# Patient Record
(demographics unavailable — no encounter records)

---

## 2025-03-22 NOTE — PHYSICAL EXAM
[Appropriately responsive] : appropriately responsive [Alert] : alert [No Acute Distress] : no acute distress [Soft] : soft [Non-tender] : non-tender [No HSM] : No HSM [Non-distended] : non-distended [No Lesions] : no lesions [No Mass] : no mass [Oriented x3] : oriented x3 [Labia Majora] : normal [Labia Minora] : normal [Normal] : normal [FreeTextEntry4] : Discharge consistent with vaginal yeast with possible concurrent bacterial vaginosis; culture collected

## 2025-03-22 NOTE — PLAN
[FreeTextEntry1] : Subjective history and physical examination consistent with a vaginal yeast infection and patient was directed for fluconazole and instructed to take 1 pill every 3 days time x 3 doses and she was also written for terconazole to be used nightly for 7 nights time.  A culture was collected and she was instructed that if the culture results indicate she may be written for additional or alternative medications.  She should continue with infectious disease, as directed.  She is given opportunity ask questions and all questions were addressed.

## 2025-04-01 NOTE — PHYSICAL EXAM
[Antalgic] : antalgic [de-identified] : CONSTITUTIONAL: The patient is a very pleasant individual who is well-nourished and who appears stated age.  She is accompanied by formal caregiver and her .  Ambulates with a forward flexed gait and with the assistance of a walker. PSYCHIATRIC: The patient is alert and oriented X 3 and in no apparent distress, and participates with orthopedic evaluation well. HEAD: Atraumatic and is nonsyndromic in appearance. EENT: No visible thyromegaly, EOMI. RESPIRATORY: Respiratory rate is regular, not dyspneic on examination. LYMPHATICS: There is no inguinal lymphadenopathy INTEGUMENTARY: Skin is clean, dry, and intact about the bilateral lower extremities and lumbar spine. VASCULAR: There is brisk capillary refill about the bilateral lower extremities. NEUROLOGIC: There are no pathologic reflexes. There is no decrease in sensation of the bilateral lower extremities on manual  examination. Deep tendon reflexes are well maintained at 2+/4 of the bilateral lower extremities and are symmetric.. MUSCULOSKELETAL: Manual motor strength is well maintained in the bilateral lower extremities. Range of motion of lumbar spine is well maintained with an obvious gibbus on the left and tenderness to palpation the right lower lumbar area, right buttock. The patient ambulates with the assistance of a walker with a flexed forward gait and wide-based gait.  Positive bilateral tension sign and negative straight leg raise bilaterally. Quad extension, ankle dorsiflexion, EHL, plantar flexion, and ankle eversion are well preserved. Normal secondary orthopaedic exam of bilateral hips, greater trochanteric area, knees and ankles There is tenderness palpation about the lower lumbar spine, sacral area on the right, right buttock [de-identified] : 2 views of the lumbar spine reviewed on today's date April 1, 2025 directly compared to x-rays from 2022 progression of lumbar spondylosis is age-appropriate may be progression of an L5 compression fracture may be a subtle superior endplate of L4 compression fracture stable L2 compression fracture stable vertebral plana at L1 which appears to be autofused into T12 and a stable T11 compression fracture.

## 2025-04-01 NOTE — DISCUSSION/SUMMARY
[de-identified] : 40 minutes was spent reviewing the x-rays as well as discussing with the patient their clinical presentation, diagnosis and providing education.  Conservative treatment was discussed with the patient at length. Anticipatory guidance regarding disease process status post slip and fall, possible sacral insufficiency fracture, possible occult lumbar compression fracture, history of osteoporosis, history of lumbar stenosis, avoidance of acute exacerbation this was discussed at length and all patients commenting concerns were answered to the patient's satisfaction.  Home-based physical therapy for decrease pain and increase function was ordered because patient is homebound, she does not drive due to being off balance,and needs to use a walker for ambulation and also has poor eyesight. Intermittent use of acetaminophen 500 mg 2 tablets t.i.d. p.r.n. pain.   the pain is above and beyond what she can tolerate she should call her pain management physician for additional recommendations.  Patient has been on Prolia in the past and should discuss bone health with her primary care provider as well as rheumatology.  Home exercise including stretching on a daily basis for 20-30 minutes was recommended. Heat, ice, topical were discussed as needed. The patient will followup in 3-4 weeks at which point in time we will go over results of lumbar MRI studies to guide treatment plan including possible injection therapy with pain management versus surgical option.  Lumbar MRI is ordered is medically necessary for status post slip and fall and suspicious for occult compression fracture at L4-L5 as well as sacral insufficiency fracture.  MRI will guide treatment plan including possible injection therapy possible kyphoplasty.  Follow-up after MRI.

## 2025-04-01 NOTE — HISTORY OF PRESENT ILLNESS
[de-identified] : 86-year-old female slipped and fell onto her behind 1 week ago and has been having pain of the right lower back and right pelvis/buttock ever since.  Pain is exacerbated by changing position sit to stand or getting up from laying down but pain is constant.  She goes to pain management on a regular basis for chronic low back pain and takes a small dose of oxycodone and muscle relaxant daily.  She uses a walker and ambulates with a flexed forward gait.  She does also have left rotator cuff tear and states that she has bone-on-bone right knee arthritis, had a total knee arthroplasty on the left August 2021.  She states she slipped and fell because her knee gave out,

## 2025-05-08 NOTE — HISTORY OF PRESENT ILLNESS
[de-identified] : Very pleasant 86-year-old female presents for evaluation of a lumbar MRI.  She has chronic pain previous lumbar compression fractures and is under the care of pain management.  Patient had her lumbar MRI completed showing acute L4 compression fracture as well as a mild right sacral alar fracture.  Worsening right knee pain [Improving] : improving [___ mths] : [unfilled] month(s) ago [1] : an average pain level of 1/10 [0] : a minimum pain level of 0/10 [5] : a maximum pain level of 5/10 [Constant] : ~He/She~ states the symptoms seem to be constant [Bending] : worsened by bending [Lifting] : worsened by lifting [Walking] : worsened by walking [Weight Bearing] : worsened by weight bearing [Rest] : relieved by rest [Ataxia] : no ataxia [Incontinence] : no incontinence [Loss of Dexterity] : good dexterity [Urinary Ret.] : no urinary retention [de-identified] : Pain management

## 2025-05-08 NOTE — DISCUSSION/SUMMARY
[de-identified] : Fracture care has been initiated with brace recommendation.  LSO brace cannot be applied secondary to the ostomy weaning continue with conservative fracture care at this point.  Continue with bone health management via PCP continue with pain management follow-up.  Patient will follow-up in 1 month for repeat fracture care assessment

## 2025-05-08 NOTE — PHYSICAL EXAM
[Antalgic] : antalgic [de-identified] : CONSTITUTIONAL: The patient is a very pleasant individual who is well-nourished and who appears stated age. PSYCHIATRIC: The patient is alert and oriented X 3 and in no apparent distress, and participates with orthopedic evaluation well. HEAD: Atraumatic and is nonsyndromic in appearance. EENT: No visible thyromegaly, EOMI. RESPIRATORY: Respiratory rate is regular, not dyspneic on examination. LYMPHATICS: There is no inguinal lymphadenopathy INTEGUMENTARY: Skin is clean, dry, and intact about the bilateral lower extremities and lumbar spine. VASCULAR: There is brisk capillary refill about the bilateral lower extremities. NEUROLOGIC: There are no pathologic reflexes. There is no decrease in sensation of the bilateral lower extremities on manual  examination. Deep tendon reflexes are well maintained at 2+/4 of the bilateral lower extremities and are symmetric.. MUSCULOSKELETAL: There is no visible muscular atrophy. Manual motor strength is well maintained in the bilateral lower extremities. Range of motion of lumbar spine is well maintained. The patient ambulates in a non-myelopathic manner. Negative tension sign and straight leg raise bilaterally. Quad extension, ankle dorsiflexion, EHL, plantar flexion, and ankle eversion are well preserved. Normal secondary orthopaedic exam of bilateral hips, greater trochanteric area, knees and ankles.  Much improved mechanically orientated low back pain. [de-identified] : MRIs been reviewed of the lumbar spine demonstrating a mild L4 compression fracture severe and chronic vertebral plana of L1 as well as a sacral alar fracture.  Lumbar x-rays taken on today's date demonstrate vertebral plana of L1 mild L4 compression and no significant displacement on the AP of the sacral ala bilaterally.  Sacral/coccyx x-rays have also been reviewed on today's date that shows no significant displacement of the alar fracture.

## 2025-07-03 NOTE — PHYSICAL EXAM
[Antalgic] : antalgic [Walker] : ambulates with walker [de-identified] : CONSTITUTIONAL: The patient is a very pleasant individual who is well-nourished and who appears stated age. PSYCHIATRIC: The patient is alert and oriented X 3 and in no apparent distress, and participates with orthopedic evaluation well. HEAD: Atraumatic and is nonsyndromic in appearance. EENT: No visible thyromegaly, EOMI. RESPIRATORY: Respiratory rate is regular, not dyspneic on examination. LYMPHATICS: There is no inguinal lymphadenopathy INTEGUMENTARY: Skin is clean, dry, and intact about the bilateral lower extremities and lumbar spine. VASCULAR: There is brisk capillary refill about the bilateral lower extremities. NEUROLOGIC: There are no pathologic reflexes. There is no decrease in sensation of the bilateral lower extremities on manual  examination. Deep tendon reflexes are well maintained at 2+/4 of the bilateral lower extremities and are symmetric.. MUSCULOSKELETAL: There is no visible muscular atrophy. Manual motor strength is well maintained in the bilateral lower extremities. Range of motion of lumbar spine is well maintained. The patient ambulates in a non-myelopathic manner. Negative tension sign and straight leg raise bilaterally. Quad extension, ankle dorsiflexion, EHL, plantar flexion, and ankle eversion are well preserved. Normal secondary orthopaedic exam of bilateral hips, greater trochanteric area, knees and ankles, much improved mechanically orientate his low back pain [de-identified] : 2 views of the lumbar spine taken on today's date demonstrates upper lumbar compression fracture/vertebral plana of L1 subtle compression of L2.  Unchanged from May 2025.  Mild L4 compression fracture not identified on this new x-ray past MRIs been reviewed showing STIR sequence hyperactivity within L4

## 2025-07-03 NOTE — HISTORY OF PRESENT ILLNESS
[de-identified] : Very pleasurable woman presents in follow-up to a chronic L1 and acute L4 and sacral alar fracture she completed physical therapy overall doing well.  Under the care of pain management Dr. Santos been using a walker/rolling walker chronically overall doing well accompanied by her aide [Improving] : improving [___ mths] : [unfilled] month(s) ago [2] : a current pain level of 2/10 [0] : a minimum pain level of 0/10 [4] : a maximum pain level of 4/10 [Intermit.] : ~He/She~ states the symptoms seem to be intermittent [Bending] : worsened by bending [Lifting] : worsened by lifting [Physical Therapy] : relieved by physical therapy [Rest] : relieved by rest [Ataxia] : no ataxia [Incontinence] : no incontinence [Loss of Dexterity] : good dexterity [Urinary Ret.] : no urinary retention

## 2025-07-03 NOTE — DISCUSSION/SUMMARY
[de-identified] : Based upon clinical improvement of this lumbar compression fracture L4 chronic lumbar compression fracture L1 and the sacral alar fracture I will continue to have conservative care being initiated.  Continue with physical therapy as allowed by insurance follow-up with Dr. Santos for pain management patient will follow-up with me 1 more time in approximately 6 to 8 weeks for repeat clinical assessment if that point in time she is essentially asymptomatic and stable she will then be followed up on a as needed basis.